# Patient Record
Sex: MALE | Race: WHITE | NOT HISPANIC OR LATINO | URBAN - METROPOLITAN AREA
[De-identification: names, ages, dates, MRNs, and addresses within clinical notes are randomized per-mention and may not be internally consistent; named-entity substitution may affect disease eponyms.]

---

## 2014-12-30 RX ORDER — METOPROLOL TARTRATE 50 MG
1 TABLET ORAL
Qty: 0 | Refills: 0 | COMMUNITY
Start: 2014-12-30

## 2017-10-23 ENCOUNTER — EMERGENCY (EMERGENCY)
Facility: HOSPITAL | Age: 62
LOS: 1 days | Discharge: HOME | End: 2017-10-23
Attending: EMERGENCY MEDICINE | Admitting: EMERGENCY MEDICINE
Payer: COMMERCIAL

## 2017-10-23 VITALS
HEART RATE: 60 BPM | WEIGHT: 169.98 LBS | HEIGHT: 67 IN | RESPIRATION RATE: 16 BRPM | DIASTOLIC BLOOD PRESSURE: 82 MMHG | OXYGEN SATURATION: 97 % | SYSTOLIC BLOOD PRESSURE: 151 MMHG | TEMPERATURE: 98 F

## 2017-10-23 VITALS
DIASTOLIC BLOOD PRESSURE: 75 MMHG | HEART RATE: 60 BPM | OXYGEN SATURATION: 98 % | SYSTOLIC BLOOD PRESSURE: 160 MMHG | RESPIRATION RATE: 16 BRPM | TEMPERATURE: 97 F

## 2017-10-23 PROCEDURE — 99283 EMERGENCY DEPT VISIT LOW MDM: CPT

## 2017-10-23 PROCEDURE — 99284 EMERGENCY DEPT VISIT MOD MDM: CPT

## 2017-10-23 RX ORDER — METOPROLOL TARTRATE 50 MG
1 TABLET ORAL
Qty: 0 | Refills: 0 | COMMUNITY

## 2017-10-23 RX ORDER — LOSARTAN POTASSIUM 100 MG/1
1 TABLET, FILM COATED ORAL
Qty: 0 | Refills: 0 | COMMUNITY

## 2017-10-23 RX ORDER — ASPIRIN/CALCIUM CARB/MAGNESIUM 324 MG
1 TABLET ORAL
Qty: 0 | Refills: 0 | COMMUNITY

## 2017-10-23 RX ORDER — ATORVASTATIN CALCIUM 80 MG/1
0 TABLET, FILM COATED ORAL
Qty: 0 | Refills: 0 | COMMUNITY

## 2017-10-23 RX ORDER — AMLODIPINE BESYLATE 2.5 MG/1
1 TABLET ORAL
Qty: 0 | Refills: 0 | COMMUNITY

## 2017-10-23 RX ADMIN — Medication 500 MILLIGRAM(S): at 17:00

## 2017-10-23 RX ADMIN — Medication 500 MILLIGRAM(S): at 16:45

## 2017-10-23 NOTE — ED PROVIDER NOTE - EXTREMITY EXAM
L leg: +ttp posterior leg inferior to L knee, no swelling or erythema noted, skin intact, neg homanns sign B, pulses and sensation intact, toes warm&mobile, cap refill<2sec, NVI. ambulatory without assistance/left lower extremity findings L leg: +ttp posterior leg inferior to L knee, no swelling/ecchymosis or erythema noted, no increased warmth/induration/fluctuance, skin intact, neg homanns sign B, calf B NT, pulses and sensation intact, toes warm&mobile, cap refill<2sec, NVI. ambulatory without assistance/left lower extremity findings

## 2017-10-23 NOTE — ED PROVIDER NOTE - OBJECTIVE STATEMENT
60 y/o M with hx of CAD, HLD, DM presents with c/o L lower leg pain x 1 hour. Pt states that he was walking down an incline and felt a "pop" in his L posterior leg. States that pain is worse when walking. Denies fall/trauma, numbness, tingling, focal weakness, open wounds, leg swelling or other symptoms.

## 2017-10-23 NOTE — ED ADULT NURSE NOTE - OBJECTIVE STATEMENT
patient is aaox3, c/o leg pain for 1 1/2 hours, patient heard "pop" sound when he used recliner, leg cool touch, pulse strong and pos, patient stated pain got worse when walks, never had this problem before,

## 2017-10-23 NOTE — ED PROVIDER NOTE - PROGRESS NOTE DETAILS
Attending Note: 60 y/o M c/o left posterior calf muscle pain after twisting leg today while walking. Denies other injury or symptom. PE reveals point tenderness of gastrocnemius muscle, no palpable deformity, no joint deformity, FROM intact, pulses and sensation intact, gait normal. Plan - likely soft tissue injury will give ace wrap, pain meds and ortho referral. Pt examined by ED attending, Dr. Bland who agreed with disposition and plan. L leg placed in ace wrap, naprosyn for pain, f/u orthopedics.

## 2017-10-23 NOTE — ED PROVIDER NOTE - CHPI ED SYMPTOMS NEG
no tingling/no weakness/no bruising/no difficulty bearing weight/no back pain/no deformity/no abrasion/no fever/no numbness

## 2017-10-23 NOTE — ED PROVIDER NOTE - MEDICAL DECISION MAKING DETAILS
62 yo m with L inferior/posterior knee pain s/p " 60 yo m with L inferior/posterior knee pain s/p walking down incline and feeling a pop, likely muscle strain but pt with FROM, NVI, neg homanns sign, will ace wrap, d/c and f/u pmd

## 2018-12-06 NOTE — ED PROVIDER NOTE - RESPIRATORY, MLM
Telephone Encounter by Mary Freedman RN, BSN at 11/30/17 05:43 PM     Author:  Mary Freedman RN, BSN Service:  (none) Author Type:  Registered Nurse     Filed:  11/30/17 05:43 PM Encounter Date:  11/30/2017 Status:  Signed     :  Mary Freedman RN, BSN (Registered Nurse)       From: Fela Martinez  Sent: 11/30/2017  5:43 PM CST  To: [facility]am [facility] Nurse Pool  Subject: RE:Levothyroxine    I am currently taking 112mg.  I thought he was going to lower it to 100mg.    could you verify that with him again.  Thanks  ----- Message -----  From: Mary ENCINAS  Sent: 11/30/2017  5:23 PM CST  To: Fela Martinez  Subject: Levothyroxine    Good evening Dr. Pennie Chahal wanted to let you know he only intended to reduce your   Levothyroxine by 24 mcg, so we have corrected the prescription sent as he   wants you to take 200 mcg daily at this time. I sent the corrected   prescription to your pharmacy now, if there are any issues or you have any   questions please let us know.    I did try to call you but wasn't able to get in touch and our phones are   off for the evening so I didn't want to leave you a message and make you   wait until tomorrow. Feel free to call us tomorrow if you'd like to   discuss as well.    ThanksMary RN       Revision History        Date/Time User Provider Type Action    > 11/30/17 05:43 PM Mary Freedman RN, BSN Registered Nurse Sign    Attribution information within the note text is not available.             Breath sounds clear and equal bilaterally.